# Patient Record
Sex: FEMALE | Race: WHITE | URBAN - METROPOLITAN AREA
[De-identification: names, ages, dates, MRNs, and addresses within clinical notes are randomized per-mention and may not be internally consistent; named-entity substitution may affect disease eponyms.]

---

## 2024-05-10 ENCOUNTER — OFFICE VISIT (OUTPATIENT)
Age: 54
End: 2024-05-10

## 2024-05-10 VITALS
SYSTOLIC BLOOD PRESSURE: 141 MMHG | HEART RATE: 64 BPM | RESPIRATION RATE: 16 BRPM | BODY MASS INDEX: 25.43 KG/M2 | DIASTOLIC BLOOD PRESSURE: 86 MMHG | OXYGEN SATURATION: 98 % | TEMPERATURE: 98.1 F | WEIGHT: 162 LBS | HEIGHT: 67 IN

## 2024-05-10 DIAGNOSIS — S61.211A LACERATION OF LEFT INDEX FINGER W/O FOREIGN BODY W/O DAMAGE TO NAIL, INITIAL ENCOUNTER: Primary | ICD-10-CM

## 2024-05-10 RX ORDER — ESCITALOPRAM OXALATE 20 MG/1
1 TABLET ORAL DAILY
COMMUNITY
Start: 2024-02-16

## 2024-05-10 RX ORDER — ESTRADIOL 0.05 MG/D
1 PATCH, EXTENDED RELEASE TRANSDERMAL
COMMUNITY
Start: 2023-01-12

## 2024-05-10 RX ORDER — PROGESTERONE 100 MG/1
100 CAPSULE ORAL DAILY
COMMUNITY

## 2024-05-10 RX ORDER — ROSUVASTATIN CALCIUM 20 MG/1
1 TABLET, COATED ORAL NIGHTLY
COMMUNITY
Start: 2024-02-16

## 2024-05-11 NOTE — PROGRESS NOTES
Gini Mohr (:  1970) is a 53 y.o. female,New patient, here for evaluation of the following chief complaint(s):  Finger Laceration (Left Pointer)      Subjective :   Gini Mohr sustained laceration of finger 1 hours ago. Nature of injury: cut on object in . Tetanus vaccination status reviewed: last tetanus booster within 10 years, tetanus re-vaccination not indicated.     Objective    Patient appears well, vitals are normal. Laceration 1 cm noted to distal end left index finger, no nail involvement.  Description: clean wound edges, no foreign bodies. Neurovascular and tendon structures are intact.     Assessment & Plan :     Visit Diagnoses and Associated Orders       Laceration of left index finger w/o foreign body w/o damage to nail, initial encounter    -  Primary         ORDERS WITHOUT AN ASSOCIATED DIAGNOSIS    escitalopram (LEXAPRO) 20 MG tablet [21745]      estradiol (VIVELLE) 0.05 MG/24HR [21931]      progesterone (PROMETRIUM) 100 MG CAPS capsule [937765]      rosuvastatin (CRESTOR) 20 MG tablet [[id number]]             Follow up in 7 days for suture removal.   Take Tylenol as needed for pain.   Keep area clean and dry.      Local Anesthesia with 1% Lidocaine without Epinephrine, 2ml. Wound cleansed and sutured.  3, 4-0 prolene stitches placed. Dressing applied.  Neurovascular intact. Full ROM. Wound care instructions provided.  Observe for any signs of infection or other problems.  Return for suture removal in 7 days.          Atiya Lambert PA-C

## 2024-05-11 NOTE — PATIENT INSTRUCTIONS
Follow up in 7 days for suture removal.   Take Tylenol as needed for pain.   Keep area clean and dry.